# Patient Record
Sex: FEMALE | Race: ASIAN | URBAN - METROPOLITAN AREA
[De-identification: names, ages, dates, MRNs, and addresses within clinical notes are randomized per-mention and may not be internally consistent; named-entity substitution may affect disease eponyms.]

---

## 2024-07-17 ENCOUNTER — ANESTHESIA EVENT (OUTPATIENT)
Dept: SURGERY | Facility: HOSPITAL | Age: 34
End: 2024-07-17
Payer: COMMERCIAL

## 2024-07-18 ENCOUNTER — ANESTHESIA (OUTPATIENT)
Dept: SURGERY | Facility: HOSPITAL | Age: 34
End: 2024-07-18
Payer: COMMERCIAL

## 2024-07-18 ENCOUNTER — APPOINTMENT (OUTPATIENT)
Dept: ULTRASOUND IMAGING | Facility: HOSPITAL | Age: 34
End: 2024-07-18
Attending: OBSTETRICS & GYNECOLOGY
Payer: COMMERCIAL

## 2024-07-18 ENCOUNTER — HOSPITAL ENCOUNTER (OUTPATIENT)
Facility: HOSPITAL | Age: 34
Setting detail: HOSPITAL OUTPATIENT SURGERY
Discharge: HOME | End: 2024-07-18
Attending: OBSTETRICS & GYNECOLOGY | Admitting: OBSTETRICS & GYNECOLOGY
Payer: COMMERCIAL

## 2024-07-18 VITALS
TEMPERATURE: 98 F | BODY MASS INDEX: 21.94 KG/M2 | HEART RATE: 59 BPM | RESPIRATION RATE: 15 BRPM | OXYGEN SATURATION: 100 % | WEIGHT: 128.5 LBS | DIASTOLIC BLOOD PRESSURE: 76 MMHG | SYSTOLIC BLOOD PRESSURE: 110 MMHG | HEIGHT: 64 IN

## 2024-07-18 DIAGNOSIS — O02.1 MISSED AB (HCC): ICD-10-CM

## 2024-07-18 LAB
ANTIBODY SCREEN: NEGATIVE
ERYTHROCYTE [DISTWIDTH] IN BLOOD BY AUTOMATED COUNT: 12.8 %
HCT VFR BLD AUTO: 34.7 %
HGB BLD-MCNC: 11.1 G/DL
MCH RBC QN AUTO: 25.3 PG (ref 26–34)
MCHC RBC AUTO-ENTMCNC: 32 G/DL (ref 31–37)
MCV RBC AUTO: 79.2 FL
PLATELET # BLD AUTO: 310 10(3)UL (ref 150–450)
RBC # BLD AUTO: 4.38 X10(6)UL
RH BLOOD TYPE: POSITIVE
RH BLOOD TYPE: POSITIVE
WBC # BLD AUTO: 7.9 X10(3) UL (ref 4–11)

## 2024-07-18 PROCEDURE — 86900 BLOOD TYPING SEROLOGIC ABO: CPT | Performed by: OBSTETRICS & GYNECOLOGY

## 2024-07-18 PROCEDURE — 10D17ZZ EXTRACTION OF PRODUCTS OF CONCEPTION, RETAINED, VIA NATURAL OR ARTIFICIAL OPENING: ICD-10-PCS | Performed by: OBSTETRICS & GYNECOLOGY

## 2024-07-18 PROCEDURE — 85027 COMPLETE CBC AUTOMATED: CPT

## 2024-07-18 PROCEDURE — 86850 RBC ANTIBODY SCREEN: CPT | Performed by: OBSTETRICS & GYNECOLOGY

## 2024-07-18 PROCEDURE — 86901 BLOOD TYPING SEROLOGIC RH(D): CPT | Performed by: OBSTETRICS & GYNECOLOGY

## 2024-07-18 PROCEDURE — 76998 US GUIDE INTRAOP: CPT | Performed by: OBSTETRICS & GYNECOLOGY

## 2024-07-18 RX ORDER — SODIUM CHLORIDE, SODIUM LACTATE, POTASSIUM CHLORIDE, CALCIUM CHLORIDE 600; 310; 30; 20 MG/100ML; MG/100ML; MG/100ML; MG/100ML
INJECTION, SOLUTION INTRAVENOUS CONTINUOUS
Status: DISCONTINUED | OUTPATIENT
Start: 2024-07-18 | End: 2024-07-18

## 2024-07-18 RX ORDER — HYDROCODONE BITARTRATE AND ACETAMINOPHEN 5; 325 MG/1; MG/1
TABLET ORAL
Status: COMPLETED
Start: 2024-07-18 | End: 2024-07-18

## 2024-07-18 RX ORDER — NALOXONE HYDROCHLORIDE 0.4 MG/ML
0.08 INJECTION, SOLUTION INTRAMUSCULAR; INTRAVENOUS; SUBCUTANEOUS AS NEEDED
Status: DISCONTINUED | OUTPATIENT
Start: 2024-07-18 | End: 2024-07-18

## 2024-07-18 RX ORDER — LIDOCAINE HYDROCHLORIDE 10 MG/ML
INJECTION, SOLUTION EPIDURAL; INFILTRATION; INTRACAUDAL; PERINEURAL AS NEEDED
Status: DISCONTINUED | OUTPATIENT
Start: 2024-07-18 | End: 2024-07-18 | Stop reason: SURG

## 2024-07-18 RX ORDER — DIPHENHYDRAMINE HYDROCHLORIDE 50 MG/ML
12.5 INJECTION INTRAMUSCULAR; INTRAVENOUS AS NEEDED
Status: DISCONTINUED | OUTPATIENT
Start: 2024-07-18 | End: 2024-07-18

## 2024-07-18 RX ORDER — HYDROCODONE BITARTRATE AND ACETAMINOPHEN 5; 325 MG/1; MG/1
1 TABLET ORAL ONCE AS NEEDED
Status: COMPLETED | OUTPATIENT
Start: 2024-07-18 | End: 2024-07-18

## 2024-07-18 RX ORDER — TRIAMCINOLONE ACETONIDE 1 MG/G
1 CREAM TOPICAL 2 TIMES DAILY
Qty: 28.4 G | Refills: 0 | Status: SHIPPED | OUTPATIENT
Start: 2024-07-18 | End: 2024-08-01

## 2024-07-18 RX ORDER — MISOPROSTOL 200 UG/1
TABLET ORAL AS NEEDED
Status: DISCONTINUED | OUTPATIENT
Start: 2024-07-18 | End: 2024-07-18 | Stop reason: HOSPADM

## 2024-07-18 RX ORDER — HYDROMORPHONE HYDROCHLORIDE 1 MG/ML
0.6 INJECTION, SOLUTION INTRAMUSCULAR; INTRAVENOUS; SUBCUTANEOUS EVERY 5 MIN PRN
Status: DISCONTINUED | OUTPATIENT
Start: 2024-07-18 | End: 2024-07-18

## 2024-07-18 RX ORDER — HYDROCODONE BITARTRATE AND ACETAMINOPHEN 5; 325 MG/1; MG/1
2 TABLET ORAL ONCE AS NEEDED
Status: COMPLETED | OUTPATIENT
Start: 2024-07-18 | End: 2024-07-18

## 2024-07-18 RX ORDER — ACETAMINOPHEN 500 MG
1000 TABLET ORAL ONCE
Status: DISCONTINUED | OUTPATIENT
Start: 2024-07-18 | End: 2024-07-18 | Stop reason: HOSPADM

## 2024-07-18 RX ORDER — HYDROMORPHONE HYDROCHLORIDE 1 MG/ML
0.4 INJECTION, SOLUTION INTRAMUSCULAR; INTRAVENOUS; SUBCUTANEOUS EVERY 5 MIN PRN
Status: DISCONTINUED | OUTPATIENT
Start: 2024-07-18 | End: 2024-07-18

## 2024-07-18 RX ORDER — SCOLOPAMINE TRANSDERMAL SYSTEM 1 MG/1
1 PATCH, EXTENDED RELEASE TRANSDERMAL ONCE
Status: DISCONTINUED | OUTPATIENT
Start: 2024-07-18 | End: 2024-07-18 | Stop reason: HOSPADM

## 2024-07-18 RX ORDER — HYDROMORPHONE HYDROCHLORIDE 1 MG/ML
0.2 INJECTION, SOLUTION INTRAMUSCULAR; INTRAVENOUS; SUBCUTANEOUS EVERY 5 MIN PRN
Status: DISCONTINUED | OUTPATIENT
Start: 2024-07-18 | End: 2024-07-18

## 2024-07-18 RX ORDER — PROCHLORPERAZINE EDISYLATE 5 MG/ML
5 INJECTION INTRAMUSCULAR; INTRAVENOUS EVERY 8 HOURS PRN
Status: DISCONTINUED | OUTPATIENT
Start: 2024-07-18 | End: 2024-07-18

## 2024-07-18 RX ORDER — ONDANSETRON 2 MG/ML
INJECTION INTRAMUSCULAR; INTRAVENOUS AS NEEDED
Status: DISCONTINUED | OUTPATIENT
Start: 2024-07-18 | End: 2024-07-18 | Stop reason: SURG

## 2024-07-18 RX ORDER — KETOROLAC TROMETHAMINE 30 MG/ML
INJECTION, SOLUTION INTRAMUSCULAR; INTRAVENOUS AS NEEDED
Status: DISCONTINUED | OUTPATIENT
Start: 2024-07-18 | End: 2024-07-18 | Stop reason: SURG

## 2024-07-18 RX ORDER — ACETAMINOPHEN 500 MG
1000 TABLET ORAL ONCE AS NEEDED
Status: COMPLETED | OUTPATIENT
Start: 2024-07-18 | End: 2024-07-18

## 2024-07-18 RX ORDER — DEXAMETHASONE SODIUM PHOSPHATE 4 MG/ML
VIAL (ML) INJECTION AS NEEDED
Status: DISCONTINUED | OUTPATIENT
Start: 2024-07-18 | End: 2024-07-18 | Stop reason: SURG

## 2024-07-18 RX ORDER — ONDANSETRON 4 MG/1
4 TABLET, FILM COATED ORAL EVERY 8 HOURS PRN
Status: CANCELLED | OUTPATIENT
Start: 2024-07-18

## 2024-07-18 RX ORDER — ONDANSETRON 2 MG/ML
4 INJECTION INTRAMUSCULAR; INTRAVENOUS EVERY 6 HOURS PRN
Status: DISCONTINUED | OUTPATIENT
Start: 2024-07-18 | End: 2024-07-18

## 2024-07-18 RX ORDER — ONDANSETRON 2 MG/ML
4 INJECTION INTRAMUSCULAR; INTRAVENOUS EVERY 8 HOURS PRN
Status: CANCELLED | OUTPATIENT
Start: 2024-07-18

## 2024-07-18 RX ADMIN — LIDOCAINE HYDROCHLORIDE 50 MG: 10 INJECTION, SOLUTION EPIDURAL; INFILTRATION; INTRACAUDAL; PERINEURAL at 09:44:00

## 2024-07-18 RX ADMIN — KETOROLAC TROMETHAMINE 30 MG: 30 INJECTION, SOLUTION INTRAMUSCULAR; INTRAVENOUS at 10:11:00

## 2024-07-18 RX ADMIN — ONDANSETRON 4 MG: 2 INJECTION INTRAMUSCULAR; INTRAVENOUS at 09:50:00

## 2024-07-18 RX ADMIN — DEXAMETHASONE SODIUM PHOSPHATE 4 MG: 4 MG/ML VIAL (ML) INJECTION at 09:50:00

## 2024-07-18 NOTE — ANESTHESIA PREPROCEDURE EVALUATION
PRE-OP EVALUATION    Patient Name: Jody Gregory    Admit Diagnosis: MISSED AB    Pre-op Diagnosis: MISSED AB    SUCTION DILATION AND CURETTAGE WITH ULTRASOUND GUIDANCE    Anesthesia Procedure: SUCTION DILATION AND CURETTAGE WITH ULTRASOUND GUIDANCE    Surgeons and Role:     * Karina Isaac MD - Primary    Pre-op vitals reviewed.  Temp: 98.6 °F (37 °C)  Pulse: 60  Resp: 16  BP: 103/70  SpO2: 100 %  Body mass index is 22.06 kg/m².    Current medications reviewed.  Hospital Medications:   acetaminophen (Tylenol Extra Strength) tab 1,000 mg  1,000 mg Oral Once    scopolamine (Transderm-Scop) 1 MG/3DAYS patch 1 patch  1 patch Transdermal Once    lactated ringers infusion   Intravenous Continuous    doxycycline hyclate (Vibramycin) 200 mg in sodium chloride 0.9% 250 mL IVPB  200 mg Intravenous Once    Rho D immune globulin (Rhophylac) 1500 Units/2mL injection 300 mcg  300 mcg Intravenous Once       Outpatient Medications:     No medications prior to admission.       Allergies: Patient has no known allergies.      Anesthesia Evaluation    Patient summary reviewed.    Anesthetic Complications  (-) history of anesthetic complications         GI/Hepatic/Renal    Negative GI/hepatic/renal ROS.                             Cardiovascular    Negative cardiovascular ROS.    Exercise tolerance: good     MET: >4                                           Endo/Other    Negative endo/other ROS.                              Pulmonary    Negative pulmonary ROS.                       Neuro/Psych    Negative neuro/psych ROS.                          As per Epic:  There is no problem list on file for this patient.          Past Surgical History:   Procedure Laterality Date    Patient denies any surgical history       Social History     Socioeconomic History    Marital status:    Tobacco Use    Smoking status: Former     Types: Cigarettes    Smokeless tobacco: Never   Vaping Use    Vaping status: Never Used   Substance and  Sexual Activity    Alcohol use: Not Currently    Drug use: Not Currently     History   Drug Use Unknown     Available pre-op labs reviewed.               Airway      Mallampati: II  Mouth opening: >3 FB  TM distance: > 6 cm  Neck ROM: full Cardiovascular      Rhythm: regular  Rate: normal  (-) murmur   Dental  Comment: Dentition is grossly intact;  Patient does not demonstrate loose teeth to inspection.           Pulmonary  Comment: Unlabored ventilatory effort, no retractions.  Pulmonary exam normal.  Breath sounds clear to auscultation bilaterally.               Other findings              ASA: 2   Plan: MAC  NPO status verified and patient meets guidelines.        Comment: Plan is MAC anesthesia, which may include deep sedation.  Implied that memory of procedure is unlikely although intraop recall, if it occurs, may be a reasonable and comfortable experience with this anesthetic.  Aware that general anesthesia is not intended though necessary care may involve deep sedation with transient moments of general anesthesia.  The backup plan for safe patient care may require change of plan to full general anesthesia with potential airway placement and attendant risks.  Patient (legal guardian) demonstrated understanding, accepted risks and benefits, and wishes to proceed with MAC anesthesia as reflected in the signed consent form.    Plan/risks discussed with: patient and spouse  Use of blood product(s) discussed with: patient and spouse    Consented to blood products.          Present on Admission:  **None**

## 2024-07-18 NOTE — H&P
HPI: 32y/o  who presents for surgical management of missed AB. She reports mild spotting since last office visit but no other changes.     SIGNIFICANT HISTORY:  HISTORY:  Past Medical History:    Calculus of kidney    2017    Visual impairment    CONTACTS/GLASSES      Past Surgical History:   Procedure Laterality Date    Patient denies any surgical history        History reviewed. No pertinent family history.   Social History     Socioeconomic History    Marital status:    Tobacco Use    Smoking status: Former     Types: Cigarettes    Smokeless tobacco: Never   Vaping Use    Vaping status: Never Used   Substance and Sexual Activity    Alcohol use: Not Currently    Drug use: Not Currently     Social Determinants of Health     Food Insecurity: No Food Insecurity (2023)    Received from [facility]    Food Insecurity     Currently or in the past 3 months, have you worried your food would run out before you had money to buy more?: No     In the past 12 months, have you run out of food or been unable to get more?: No   Transportation Needs: No Transportation Needs (2023)    Received from [facility]    Transportation Needs     Medical Transportation Needs?: No    Received from [facility]    Housing Stability        Past Surgical History:   Procedure Laterality Date    Patient denies any surgical history       OB History   No obstetric history on file.     Social History     Socioeconomic History    Marital status:    Tobacco Use    Smoking status: Former     Types: Cigarettes    Smokeless tobacco: Never   Vaping Use    Vaping status: Never Used   Substance and Sexual Activity    Alcohol use: Not Currently    Drug use: Not Currently     Social Determinants of Health     Food Insecurity: No Food Insecurity (2023)    Received from [facility]    Food Insecurity     Currently or in the past 3 months, have you worried  your food would run out before you had money to buy more?: No     In the past 12 months, have you run out of food or been unable to get more?: No   Transportation Needs: No Transportation Needs (7/26/2023)    Received from [facility]    Transportation Needs     Medical Transportation Needs?: No    Received from [facility]    Housing Stability         ROS:  GENERAL HEALTH: feels well otherwise  GI: denies nausea, vomiting, constipation, diarrhea; no rectal bleeding; no heartburn  GYNE/: no dysuria, urgency or frequency; no hx abnormal pap smear; no vaginal discharge; no dyspareunia; periods regular; no urinary incontinence  MUSCULOSKELETAL: no joint complaints upper or lower extremities  PSYCHE: no symptoms of depression or anxiety  HEMATOLOGY: denies hx anemia; denies bruising or excessive bleeding  ENDOCRINE:  denies unexpected wt gain or wt loss  ALLERGY/IMM.: denies food or seasonal allergies    PHYSICAL EXAM:  GENERAL: well developed, well nourished, in no apparent distress  SKIN: no rashes, no suspicious lesions  HEENT: normal  LUNGS: clear to auscultation  CARDIOVASCULAR: normal S1, S2, RRR  ABDOMEN: Soft, non distended; non tender, no masses  GYNE/: Deferred to OF  EXTREMITIES:  non tender without edema      ASSESMENT/PLAN:  1. Surgery to be performed: suction D&C under ultrasound guidance  2. Indication: missed AB  3. The procedure, its risks, benefits, possible complications and alternatives discussed with the patient.  She understands and agrees to the procedure.      Karina Isaac MD

## 2024-07-18 NOTE — DISCHARGE INSTRUCTIONS
Dilatation and Curettage    Perez Curran, Gaurav, Arie Chambers Sayla, Schreiber, Peg, Dale, Weeks    After surgery you may have some light vaginal bleeding. This may last up to a week and is not a concern unless it is heavier than a menstrual period. If you had an endometrial ablation, you can expect a watery discharge which may last for up to 4-6 weeks.    You should avoid tampons for the first week after surgery. Also no intercourse for one week after a D and C and two weeks after an ablation.    You may experience cramping the day/evening of surgery. This is usually relieved with over the counter Acetaminophen (Tylenol), Ibuprofen (Motrin or Advil) or Naprosyn (Aleve). A prescription pain medicine may be ordered by your physician. You may also be given a medication for possible nausea.    You should rest the day of surgery. No driving for 24 hours after general anesthesia or sedation or if you are taking prescription pain medications. You may resume normal activities the next day. Showering is fine the day after surgery. Exercise is fine the next day if you are comfortable doing it.    You may resume your normal diet once your appetite returns after surgery.  Some patients will experience nausea from anesthesia or narcotics: we recommend you start with a light diet. Do not drink alcohol or use other sedating medications (sleep aids, sedating cold medications) if taking prescription pain medications. Resume your normal medications as instructed.    Please call our office if you experience any of the following symptoms:  Temperature over 100.5 degrees  Vaginal bleeding heavier than a moderate period  Severe abdominal/pelvic pain  Shortness of breath or chest pain  New onset of leg pain and/or swelling    If a specimen was sent to pathology, you can expect a call from your doctor within 10 days with the report.   If your doctor requested a post op appointment, please call to schedule an  appointment for 2 weeks post op.    Please call with any other questions or concerns.    You received a drug called Toradol which in an anti-inflammatory at : 10:10 am  If you are allowed to take anti-inflammatories,  Do not take Motrin, Advil, Aleve, or ibuprofen until : 4:10 PM      Norco was given to you at: 11:00 am  Next dose Tylenol due: 5:00 pm  Take this medication as directed  This medication contains Tylenol  Do not take addition Tylenol while taking Norco    This medication is a narcotic and can be constipating or upset your stomach  No driving or drinking alcohol while taking    Office Number: [phone]    In the event that your decision changes regarding the disposition of your baby, please contact the [facility] at [phone] within one day of your procedure.

## 2024-07-18 NOTE — ANESTHESIA POSTPROCEDURE EVALUATION
[facility] Patient Status:  Hospital Outpatient Surgery   Age/Gender 33 year old female MRN [id number]   Location [facility] SURGERY Attending Karina Isaac MD   Hosp Day # 0 PCP No primary care provider on file.       Anesthesia Post-op Note    SUCTION DILATION AND CURETTAGE WITH ULTRASOUND GUIDANCE    Procedure Summary       Date: 07/18/24 Room / Location:  MAIN OR 06 /  MAIN OR    Anesthesia Start: 0942 Anesthesia Stop: 1024    Procedure: SUCTION DILATION AND CURETTAGE WITH ULTRASOUND GUIDANCE (Vagina ) Diagnosis: (MISSED AB)    Surgeons: Karina Isaac MD Anesthesiologist: Richi Coronado MD    Anesthesia Type: MAC ASA Status: 2            Anesthesia Type: MAC    Vitals Value Taken Time   /74 07/18/24 1028   Temp 98.1 °F (36.7 °C) 07/18/24 1028   Pulse 75 07/18/24 1028   Resp 15 07/18/24 1028   SpO2 98 % 07/18/24 1028       Patient Location: Same Day Surgery    Anesthesia Type: MAC    Airway Patency: patent    Postop Pain Control: adequate    Mental Status: mildly sedated but able to meaningfully participate in the post-anesthesia evaluation    Nausea/Vomiting: none    Cardiopulmonary/Hydration status: stable euvolemic    Complications: no apparent anesthesia related complications    Postop vital signs: stable    Comments: report given to RN    Dental Exam: Unchanged from Preop    Patient to be discharged from PACU when criteria met.

## 2024-07-18 NOTE — OPERATIVE REPORT
[facility]  Operative Note    Pre-Op Diagnosis: missed AB    Post-Op Diagnosis: same    Procedure: suction D&D under ultrasound guidance    Surgeon: Karina Isaac MD    Anesthesia: MAC    Surgical Findings:     Small, anteverted uterus  Small amount vulvar irritation noted  Normal appearing vagina  Moderate amount of POC removed with suction D&C  Thin endometrial stripe post-procedure    Specimen: POC    EBL:  50cc    Complications: None    Disposition: Recovery room in stable condition    Patient was taken to operating room where NAC anesthesia was obtained without difficulty. She was prepped and draped in the usual sterile fashion in the dorsal lithotomy position. EUA revealed the aforementioned findings. Anterior lip of cervix was grasped with a single-toothed tenaculum.  The cervix was serially dilated to size 7 dilator. A size 7 curved curette was inserted into the uterus and suction device was activated. Suction was rotated until all POCs were removed from the uterine cavity under ultrasound guidance. A sharp curettage was then done and a gritty texture was noted at the uterine surface. A final pass with the suction was made. Instruments were removed from the vagina. Cytotec 1000mcg was inserted rectally at the end of the procedure. Patient tolerated the procedure well. Counts were correct x2.    Karina Isaac MD

## 2024-07-25 ENCOUNTER — TELEPHONE (OUTPATIENT)
Dept: OBGYN UNIT | Facility: HOSPITAL | Age: 34
End: 2024-07-25

## 2025-01-07 RX ORDER — CHOLECALCIFEROL (VITAMIN D3) 25 MCG
1 TABLET,CHEWABLE ORAL DAILY
COMMUNITY
End: 2025-01-09

## 2025-01-07 RX ORDER — MULTIVIT WITH MINERALS/LUTEIN
1000 TABLET ORAL DAILY
COMMUNITY
End: 2025-01-09

## 2025-01-07 RX ORDER — CHLORAL HYDRATE 500 MG
1000 CAPSULE ORAL DAILY
COMMUNITY
End: 2025-01-09

## 2025-01-09 ENCOUNTER — HOSPITAL ENCOUNTER (OUTPATIENT)
Facility: HOSPITAL | Age: 35
Setting detail: HOSPITAL OUTPATIENT SURGERY
Discharge: HOME | End: 2025-01-09
Attending: OBSTETRICS & GYNECOLOGY | Admitting: OBSTETRICS & GYNECOLOGY
Payer: COMMERCIAL

## 2025-01-09 ENCOUNTER — ANESTHESIA EVENT (OUTPATIENT)
Dept: SURGERY | Facility: HOSPITAL | Age: 35
End: 2025-01-09
Payer: COMMERCIAL

## 2025-01-09 ENCOUNTER — ANESTHESIA (OUTPATIENT)
Dept: SURGERY | Facility: HOSPITAL | Age: 35
End: 2025-01-09
Payer: COMMERCIAL

## 2025-01-09 VITALS
RESPIRATION RATE: 16 BRPM | DIASTOLIC BLOOD PRESSURE: 70 MMHG | WEIGHT: 129 LBS | OXYGEN SATURATION: 100 % | BODY MASS INDEX: 22.02 KG/M2 | TEMPERATURE: 98 F | HEART RATE: 66 BPM | SYSTOLIC BLOOD PRESSURE: 116 MMHG | HEIGHT: 64 IN

## 2025-01-09 LAB
ERYTHROCYTE [DISTWIDTH] IN BLOOD BY AUTOMATED COUNT: 12.8 %
HCT VFR BLD AUTO: 38 %
HGB BLD-MCNC: 12.2 G/DL
MCH RBC QN AUTO: 25.6 PG (ref 26–34)
MCHC RBC AUTO-ENTMCNC: 32.1 G/DL (ref 31–37)
MCV RBC AUTO: 79.7 FL
PLATELET # BLD AUTO: 308 10(3)UL (ref 150–450)
RBC # BLD AUTO: 4.77 X10(6)UL
WBC # BLD AUTO: 9.5 X10(3) UL (ref 4–11)

## 2025-01-09 PROCEDURE — 88305 TISSUE EXAM BY PATHOLOGIST: CPT | Performed by: OBSTETRICS & GYNECOLOGY

## 2025-01-09 PROCEDURE — 10D17ZZ EXTRACTION OF PRODUCTS OF CONCEPTION, RETAINED, VIA NATURAL OR ARTIFICIAL OPENING: ICD-10-PCS | Performed by: OBSTETRICS & GYNECOLOGY

## 2025-01-09 PROCEDURE — 85027 COMPLETE CBC AUTOMATED: CPT

## 2025-01-09 RX ORDER — PROCHLORPERAZINE EDISYLATE 5 MG/ML
5 INJECTION INTRAMUSCULAR; INTRAVENOUS EVERY 8 HOURS PRN
Status: DISCONTINUED | OUTPATIENT
Start: 2025-01-09 | End: 2025-01-09

## 2025-01-09 RX ORDER — MIDAZOLAM HYDROCHLORIDE 1 MG/ML
1 INJECTION INTRAMUSCULAR; INTRAVENOUS EVERY 5 MIN PRN
Status: DISCONTINUED | OUTPATIENT
Start: 2025-01-09 | End: 2025-01-09

## 2025-01-09 RX ORDER — HYDROMORPHONE HYDROCHLORIDE 1 MG/ML
0.6 INJECTION, SOLUTION INTRAMUSCULAR; INTRAVENOUS; SUBCUTANEOUS EVERY 5 MIN PRN
Status: DISCONTINUED | OUTPATIENT
Start: 2025-01-09 | End: 2025-01-09

## 2025-01-09 RX ORDER — ACETAMINOPHEN 500 MG
1000 TABLET ORAL ONCE
Status: DISCONTINUED | OUTPATIENT
Start: 2025-01-09 | End: 2025-01-09 | Stop reason: HOSPADM

## 2025-01-09 RX ORDER — DIPHENHYDRAMINE HYDROCHLORIDE 50 MG/ML
12.5 INJECTION INTRAMUSCULAR; INTRAVENOUS AS NEEDED
Status: DISCONTINUED | OUTPATIENT
Start: 2025-01-09 | End: 2025-01-09

## 2025-01-09 RX ORDER — HYDROCODONE BITARTRATE AND ACETAMINOPHEN 5; 325 MG/1; MG/1
1 TABLET ORAL ONCE AS NEEDED
Status: COMPLETED | OUTPATIENT
Start: 2025-01-09 | End: 2025-01-09

## 2025-01-09 RX ORDER — ONDANSETRON 2 MG/ML
4 INJECTION INTRAMUSCULAR; INTRAVENOUS EVERY 6 HOURS PRN
Status: DISCONTINUED | OUTPATIENT
Start: 2025-01-09 | End: 2025-01-09

## 2025-01-09 RX ORDER — HYDROMORPHONE HYDROCHLORIDE 1 MG/ML
0.4 INJECTION, SOLUTION INTRAMUSCULAR; INTRAVENOUS; SUBCUTANEOUS EVERY 5 MIN PRN
Status: DISCONTINUED | OUTPATIENT
Start: 2025-01-09 | End: 2025-01-09

## 2025-01-09 RX ORDER — SCOPOLAMINE 1 MG/3D
1 PATCH, EXTENDED RELEASE TRANSDERMAL ONCE
Status: DISCONTINUED | OUTPATIENT
Start: 2025-01-09 | End: 2025-01-09 | Stop reason: HOSPADM

## 2025-01-09 RX ORDER — SODIUM CHLORIDE, SODIUM LACTATE, POTASSIUM CHLORIDE, CALCIUM CHLORIDE 600; 310; 30; 20 MG/100ML; MG/100ML; MG/100ML; MG/100ML
INJECTION, SOLUTION INTRAVENOUS CONTINUOUS
Status: DISCONTINUED | OUTPATIENT
Start: 2025-01-09 | End: 2025-01-09

## 2025-01-09 RX ORDER — NALOXONE HYDROCHLORIDE 0.4 MG/ML
0.08 INJECTION, SOLUTION INTRAMUSCULAR; INTRAVENOUS; SUBCUTANEOUS AS NEEDED
Status: DISCONTINUED | OUTPATIENT
Start: 2025-01-09 | End: 2025-01-09

## 2025-01-09 RX ORDER — HYDROMORPHONE HYDROCHLORIDE 1 MG/ML
0.2 INJECTION, SOLUTION INTRAMUSCULAR; INTRAVENOUS; SUBCUTANEOUS EVERY 5 MIN PRN
Status: DISCONTINUED | OUTPATIENT
Start: 2025-01-09 | End: 2025-01-09

## 2025-01-09 RX ORDER — KETOROLAC TROMETHAMINE 30 MG/ML
INJECTION, SOLUTION INTRAMUSCULAR; INTRAVENOUS AS NEEDED
Status: DISCONTINUED | OUTPATIENT
Start: 2025-01-09 | End: 2025-01-09 | Stop reason: SURG

## 2025-01-09 RX ORDER — HYDROCODONE BITARTRATE AND ACETAMINOPHEN 5; 325 MG/1; MG/1
2 TABLET ORAL ONCE AS NEEDED
Status: COMPLETED | OUTPATIENT
Start: 2025-01-09 | End: 2025-01-09

## 2025-01-09 RX ORDER — LIDOCAINE HYDROCHLORIDE 10 MG/ML
INJECTION, SOLUTION EPIDURAL; INFILTRATION; INTRACAUDAL; PERINEURAL AS NEEDED
Status: DISCONTINUED | OUTPATIENT
Start: 2025-01-09 | End: 2025-01-09 | Stop reason: SURG

## 2025-01-09 RX ORDER — ACETAMINOPHEN 500 MG
1000 TABLET ORAL ONCE AS NEEDED
Status: COMPLETED | OUTPATIENT
Start: 2025-01-09 | End: 2025-01-09

## 2025-01-09 RX ADMIN — KETOROLAC TROMETHAMINE 30 MG: 30 INJECTION, SOLUTION INTRAMUSCULAR; INTRAVENOUS at 12:05:00

## 2025-01-09 RX ADMIN — LIDOCAINE HYDROCHLORIDE 100 MG: 10 INJECTION, SOLUTION EPIDURAL; INFILTRATION; INTRACAUDAL; PERINEURAL at 11:53:00

## 2025-01-09 NOTE — OPERATIVE REPORT
Operative Note    Preop diagnosis: Missed   Postop diagnosis: Same  Procedure:  Suction D&C  Surgeon:  DANK Chambers  Anesthesia:  mac  Findings:  Uterus AV, 6-8 wks size, cervix closed, adnexa WNL  Specimens:  moderate amount of products of conception  EBL:   50 cc  Complications:  None  Disposition:  Recovery room in stable condition.  Procedure:   After assuring informed consent, the patient was taken to the operating room where anesthesia was administered and found to be adequate. She was placed in the dorsal lithotomy position with Jacques stirrups. The perineum and vagina were prepped and draped in the usual sterile fashion. The bladder was emptied with a straight catheter.   A weighted speculum was placed in the vagina and the cervix was visualized. The anterior lip of the cervix was grasped with an allis clamp. The cervix was gently dilated to 8mm. The 7 mm curved suction curette was advanced to the uterine fundus. Suction curettage was performed with a moderate amount of products of conception retrieved. A gentle sharp curettage followed until a gritty texture was appreciated.   All instruments were removed from the vagina. The uterus was noted to be firm on exam. Good hemostasis was confirmed at the tenaculum site. The patient tolerated the procedure well and was taken to the recovery room in stable condition.   Patient blood type: B pos

## 2025-01-09 NOTE — DISCHARGE INSTRUCTIONS
In the event that your decision changes regarding the disposition of your baby, please contact the [facility] at [phone] within one day of your procedure.    Hysteroscopy and Dilatation and Curettage    Perez Curran, Gaurav, Arie Chambers Sayla, Schreiber, Peg, Dale, Weeks    After surgery you may have some light vaginal bleeding. This may last up to a week and is not a concern unless it is heavier than a menstrual period.     You should avoid tampons for the first week after surgery. Also no intercourse for one week after a D and C    You may experience cramping the day/evening of surgery. This is usually relieved with over the counter Acetaminophen (Tylenol), Ibuprofen (Motrin or Advil) or Naprosyn (Aleve). A prescription pain medicine may be ordered by your physician. You may also be given a medication for possible nausea.    You should rest the day of surgery. No driving for 24 hours after general anesthesia or sedation or if you are taking prescription pain medications. You may resume normal activities the next day. Showering is fine the day after surgery. Exercise is fine the next day if you are comfortable doing it.    You may resume your normal diet once your appetite returns after surgery.  Some patients will experience nausea from anesthesia or narcotics: we recommend you start with a light diet. Do not drink alcohol or use other sedating medications (sleep aids, sedating cold medications) if taking prescription pain medications. Resume your normal medications as instructed.    Please call our office if you experience any of the following symptoms:  Temperature over 100.5 degrees  Vaginal bleeding heavier than a moderate period  Severe abdominal/pelvic pain  Shortness of breath or chest pain  New onset of leg pain and/or swelling    If a specimen was sent to pathology, you can expect a call from your doctor within 10 days with the report.   If your doctor requested  a post op appointment, please call to schedule an appointment for 2 weeks post op.    Please call with any other questions or concerns.    Office Number: [phone]    You received a drug called Toradol which is an Anti Inflammatory at: 12:15  If you are allowed to take Anti inflammatories:    Do not take any Anti Inflammatory like Motrin, Aleve or Ibuprophen until after: 6:15pm  Please report any suspected allergic reactions or bleeding issues to your doctor

## 2025-01-09 NOTE — H&P
GENERAL OFFICE POLICIES      Telephone Calls: Messages will be answered within 1-2 business days, unless the provider is out of the office.  If it is urgent a covering provider will answer. (this does not include Medication refills).    MyChart:  We recommend all patients sign up for [employer].  Through this portal you can see your lab results, request refills, schedule appointments, pay your bill and send messages to the office.   [employer] messages will be answered within 1-2 business days unless the provider is out of the office.  For urgent matters, please call the office.  Appointments:  All appointments must be scheduled.  We ask all patients to schedule their next follow up appointment before they leave the office to make sure you will be able to be seen before you run out of medications.  24 hours notice is required to cancel or reschedule an appointment to avoid being marked as a no show.  You may be dismissed from the practice after 3 no shows.    LATE for Appointment: If you are 15 or more minutes late for your appointment, you may be asked to reschedule.  MA/LAB APPTS: Must be scheduled, cannot accept walk in lab visits.  We only draw labs for patients established in our office.  We only do injections for medications ordered by our office.  Acute Sick Visits:  Nothing other than acute complaint will be addressed at this visit.  TRADITIONAL MEDICARE  DOES NOT COVER PHYSICALS  MEDICARE WELLNESS VISITS: These are NOT physicals but the free annual visit offered by Medicare to discuss wellness issues. Medication refills, checkups, etc. will not be addressed during this visit.  Medication Refills: Refills are handled electronically so please contact your pharmacy for medication refills even if current refills have been exhausted. If you are on a controlled medication you will be referred to a specialist (pain specialist, psychiatry, etc).   Forms: There is a $35 fee to fill out FMLA/Disability paperwork, payable  PREOPERATIVE HISTORY AND PHYSICAL:    HPI: The patient is a 34 year old No obstetric history on file. Patient's last menstrual period was 05/10/2024 (exact date). with recurrent missed .     2025 TVUS: TV: IUP @ 7 wks, no FCA; no adnexal masses     She is now scheduled for suction D&C.    PMH:  OB history:   2024 SpAb  MEDICAL history:   Past Medical History:    Calculus of kidney    2017    Visual impairment    glasses     SURGICAL history:   Past Surgical History:   Procedure Laterality Date    Patient denies any surgical history       MEDICATIONS:    No current outpatient medications on file.      ALLERGIES:   NKDA  CIGARETE USE:    Social History     Tobacco Use    Smoking status: Former     Types: Cigarettes    Smokeless tobacco: Never   Substance Use Topics    Alcohol use: Not Currently     PHYSICAL EXAM:   /65 (BP Location: Left arm)   Pulse 66   Temp 98.4 °F (36.9 °C) (Oral)   Resp 16   Ht 5' 4\" (1.626 m)   Wt 128 lb 15.5 oz (58.5 kg)   LMP 05/10/2024 (Exact Date)   SpO2 100%   Breastfeeding No   BMI 22.14 kg/m²   general    well-nourished, well-developed, in NAD  neuro      alert and oriented to person, place, and time  neck       supple, no LAD, no thyromegaly, no nodules  heart       RRR, NL S1 and S2, no murmurs  lungs      NL respiratory effort, CTA bilaterally  abdomen         soft, NT, ND, no HSM, no masses or hernia  skin         no rashes, lesions, or ulcers    Deferred to OR    ASSESSMENT: Recurrent missed     PLAN:  Suction D&C    Pt declines POC for chromosome analysis  Surgery has been discussed in detail with the patient and her partner, including the expected procedure, possible findings, and post op course. Risks specific to this surgery, such as intrauterine scarring and uterine perforation, were also reviewed in detail. We discussed general risks of surgery which include but are not limited to risks of anesthesia, infection, bleeding, and DVT.  Alternate therapy such as expectant mgt has been offered and declined. All questions were answered in detail. Pt verbalizes understanding of the above planned procedure. Pt and partner decline POC for chromosome analysis

## 2025-01-09 NOTE — ANESTHESIA PREPROCEDURE EVALUATION
PRE-OP EVALUATION    Patient Name: Jody Gregory    Admit Diagnosis: MISSED AB    Pre-op Diagnosis: MISSED AB    SUCTION DILATION AND CURETTAGE    Anesthesia Procedure: SUCTION DILATION AND CURETTAGE    Surgeons and Role:     * Jesús Chambers MD - Primary    Pre-op vitals reviewed.  Temp: 98.4 °F (36.9 °C)  Pulse: 66  Resp: 16  BP: 105/65  SpO2: 100 %  Body mass index is 22.14 kg/m².    Current medications reviewed.  Hospital Medications:   acetaminophen (Tylenol Extra Strength) tab 1,000 mg  1,000 mg Oral Once    scopolamine (Transderm-Scop) 1 MG/3DAYS patch 1 patch  1 patch Transdermal Once    lactated ringers infusion   Intravenous Continuous    doxycycline hyclate (Vibramycin) 200 mg in sodium chloride 0.9% 250 mL IVPB  200 mg Intravenous Once       Outpatient Medications:   Prescriptions Prior to Admission[1]    Allergies: Patient has no known allergies.      Anesthesia Evaluation    Patient summary reviewed.    Anesthetic Complications           GI/Hepatic/Renal                                 Cardiovascular                                                       Endo/Other                                  Pulmonary                           Neuro/Psych                                      Past Surgical History:   Procedure Laterality Date    Patient denies any surgical history       Social History     Socioeconomic History    Marital status:    Tobacco Use    Smoking status: Former     Types: Cigarettes    Smokeless tobacco: Never   Vaping Use    Vaping status: Never Used   Substance and Sexual Activity    Alcohol use: Not Currently    Drug use: Not Currently     History   Drug Use Unknown     Available pre-op labs reviewed.  Lab Results   Component Value Date    WBC 9.5 01/09/2025    RBC 4.77 01/09/2025    HGB 12.2 01/09/2025    HCT 38.0 01/09/2025    MCV 79.7 (L) 01/09/2025    MCH 25.6 (L) 01/09/2025    MCHC 32.1 01/09/2025    RDW 12.8 01/09/2025    .0 01/09/2025                Airway      Mallampati: II  Mouth opening: >3 FB  TM distance: > 6 cm  Neck ROM: full Cardiovascular    Cardiovascular exam normal.         Dental             Pulmonary    Pulmonary exam normal.                 Other findings              ASA: 1   Plan: MAC and general  NPO status verified and           Plan/risks discussed with: patient                Present on Admission:  **None**             [1]   Medications Prior to Admission   Medication Sig Dispense Refill Last Dose/Taking    prenatal vitamin with DHA 27-0.8-228 MG Oral Cap Take 1 capsule by mouth daily.   1/5/2025    Ascorbic Acid (VITAMIN C) 1000 MG Oral Tab Take 1 tablet (1,000 mg total) by mouth daily.   1/5/2025    omega-3 fatty acids 1000 MG Oral Cap Take 1,000 mg by mouth daily.   1/5/2025

## 2025-01-09 NOTE — ANESTHESIA POSTPROCEDURE EVALUATION
[facility] Patient Status:  Hospital Outpatient Surgery   Age/Gender 34 year old female MRN [id number]   Location [facility] PERIOPERATIVE SERVICE Attending Jesús Chambers MD   Hosp Day # 0 PCP ERICKA MORALES MD       Anesthesia Post-op Note    SUCTION DILATION AND CURETTAGE    Procedure Summary       Date: 01/09/25 Room / Location:  MAIN OR 02 / [facility] MAIN OR    Anesthesia Start: 1150 Anesthesia Stop: 1234    Procedure: SUCTION DILATION AND CURETTAGE (Uterus) Diagnosis: (MISSED AB)    Surgeons: Jesús Chambers MD Anesthesiologist: Nick Armas MD    Anesthesia Type: MAC, general ASA Status: 1            Anesthesia Type: MAC, general    Vitals Value Taken Time   /64 01/09/25 1257   Temp 97.4 01/09/25 1257   Pulse 72 01/09/25 1257   Resp 16 01/09/25 1257   SpO2 98 01/09/25 1257           Patient Location: Same Day Surgery    Anesthesia Type: MAC    Airway Patency: patent    Postop Pain Control: adequate    Mental Status: preanesthetic baseline    Nausea/Vomiting: none    Cardiopulmonary/Hydration status: stable euvolemic    Complications: no apparent anesthesia related complications    Postop vital signs: stable    Dental Exam: Unchanged from Preop

## 2025-01-13 ENCOUNTER — TELEPHONE (OUTPATIENT)
Dept: OBGYN UNIT | Facility: HOSPITAL | Age: 35
End: 2025-01-13

## (undated) DEVICE — HOSE CONN 18IN SER FOR BERK SAFETOUCH COLL

## (undated) DEVICE — SYSTEM COLL W/ TISS TRAP INCLUDE COLL CANSTR

## (undated) DEVICE — SOLUTION IRRIG 1000ML 0.9% NACL USP BTL

## (undated) DEVICE — SLEEVE COMPR MD KNEE LEN SGL USE KENDALL SCD

## (undated) DEVICE — GLOVE SUR 7 SENSICARE PI PIP CRM PWD F

## (undated) DEVICE — Device

## (undated) DEVICE — PACK GYNE CUSTOM

## (undated) DEVICE — PREMIUM WET SKIN PREP TRAY: Brand: MEDLINE INDUSTRIES, INC.

## (undated) DEVICE — GLOVE SUR 6 SENSICARE PI PIP CRM PWD F

## (undated) DEVICE — CURETTE VAC 7MM CRV VACURET

## (undated) DEVICE — GLOVE SUR 7 SENSICARE PI PIP GRN PWD F